# Patient Record
Sex: MALE
[De-identification: names, ages, dates, MRNs, and addresses within clinical notes are randomized per-mention and may not be internally consistent; named-entity substitution may affect disease eponyms.]

---

## 2018-04-23 ENCOUNTER — APPOINTMENT (OUTPATIENT)
Dept: NEUROSURGERY | Facility: CLINIC | Age: 48
End: 2018-04-23

## 2018-04-23 PROBLEM — Z00.00 ENCOUNTER FOR PREVENTIVE HEALTH EXAMINATION: Status: ACTIVE | Noted: 2018-04-23

## 2018-05-10 ENCOUNTER — TRANSCRIPTION ENCOUNTER (OUTPATIENT)
Age: 48
End: 2018-05-10

## 2018-07-17 ENCOUNTER — APPOINTMENT (OUTPATIENT)
Dept: NEUROSURGERY | Facility: CLINIC | Age: 48
End: 2018-07-17
Payer: MEDICAID

## 2018-07-17 PROCEDURE — 99441: CPT

## 2018-08-27 ENCOUNTER — TRANSCRIPTION ENCOUNTER (OUTPATIENT)
Age: 48
End: 2018-08-27

## 2018-09-07 ENCOUNTER — TRANSCRIPTION ENCOUNTER (OUTPATIENT)
Age: 48
End: 2018-09-07

## 2019-01-28 ENCOUNTER — TRANSCRIPTION ENCOUNTER (OUTPATIENT)
Age: 49
End: 2019-01-28

## 2019-06-25 ENCOUNTER — APPOINTMENT (OUTPATIENT)
Dept: NEUROSURGERY | Facility: CLINIC | Age: 49
End: 2019-06-25
Payer: MEDICAID

## 2019-06-25 VITALS — WEIGHT: 162 LBS | BODY MASS INDEX: 23.19 KG/M2 | HEIGHT: 70 IN

## 2019-06-25 DIAGNOSIS — F17.200 NICOTINE DEPENDENCE, UNSPECIFIED, UNCOMPLICATED: ICD-10-CM

## 2019-06-25 DIAGNOSIS — Z87.09 PERSONAL HISTORY OF OTHER DISEASES OF THE RESPIRATORY SYSTEM: ICD-10-CM

## 2019-06-25 PROCEDURE — 99205 OFFICE O/P NEW HI 60 MIN: CPT

## 2019-06-25 RX ORDER — BIOTIN 10 MG
TABLET ORAL
Refills: 0 | Status: ACTIVE | COMMUNITY

## 2019-06-25 RX ORDER — ALBUTEROL 90 MCG
AEROSOL (GRAM) INHALATION
Refills: 0 | Status: ACTIVE | COMMUNITY

## 2019-06-25 NOTE — PHYSICAL EXAM
[FreeTextEntry1] : Physical exam: \par \par Vital Signs - stable\par \par General - well appearing in no acute distress\par \par HEENT-normocephalic atraumatic\par \par Skin-intact\par \par CV-no edema, good distal pulses, radial and DP\par \par Lungs-no wheezing or cyanosis\par \par Musculoskeletal- significant decreased ROM; no tenderness to palpation; negative Spurling’s; negative Lhermittes; negative straight leg raise; negative FABERs; negative Tinels\par \par Neuro- awake, alert, and oriented; CN II-XII intact; 5/5 strength; sensation intact; reflexes normal; no Hoffmans or clonus, gait intact\par \par \par

## 2019-06-25 NOTE — HISTORY OF PRESENT ILLNESS
[FreeTextEntry1] : This is a 49 yrs old male with a history of two cervical spine surgery presents today for a consultation of low back pain radiating to right posterior leg down the calf for 10 years, and feels it is worsening. Denies numbness, tingling, and weakness. 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 He has not been evaluated by pain management due to his fear of needles. He is seeing a chiropractor who manipulates his back, and he does get relief with his symptoms. Pain is worse with lifting and sitting. Patient does not have any recent imaging.

## 2019-09-10 ENCOUNTER — APPOINTMENT (OUTPATIENT)
Dept: NEUROSURGERY | Facility: CLINIC | Age: 49
End: 2019-09-10
Payer: MEDICAID

## 2019-09-10 VITALS — BODY MASS INDEX: 22.9 KG/M2 | HEIGHT: 70 IN | WEIGHT: 160 LBS

## 2019-09-10 PROCEDURE — 99213 OFFICE O/P EST LOW 20 MIN: CPT

## 2019-09-10 RX ORDER — CHLORZOXAZONE 500 MG/1
500 TABLET ORAL EVERY 8 HOURS
Qty: 15 | Refills: 0 | Status: DISCONTINUED | COMMUNITY
Start: 2019-06-25 | End: 2019-09-10

## 2019-09-10 NOTE — HISTORY OF PRESENT ILLNESS
[FreeTextEntry1] : Patient returns in follow up to discuss his MRI results, reports of low back pain radiating to both hips. Today he denies right leg pain, though on occasion he pain does radiate to the leg. He reports he did not attempt to pursue to see pain management. 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 \par \par MRI of the lumbar spine without contrast done on 8/13/19 showed mild to moderate multilevel degenerative changes of lumbar spine, worse at L4-5. Neural foraminal stenosis is moderate on the left at L3-4 and is moderate on the right at L5-S1.\par \par Xray of the lumbar spine done on 6/28/19 showed degenerative changes most pronounced at L4-5 and L5-S1

## 2019-09-10 NOTE — ASSESSMENT
[FreeTextEntry1] : At this time, I do not believe Mr. Matos is a good surgical candidate.  I recommend pain management for injections.

## 2019-10-30 ENCOUNTER — RX RENEWAL (OUTPATIENT)
Age: 49
End: 2019-10-30

## 2020-09-15 ENCOUNTER — RX RENEWAL (OUTPATIENT)
Age: 50
End: 2020-09-15

## 2021-01-04 ENCOUNTER — RX RENEWAL (OUTPATIENT)
Age: 51
End: 2021-01-04

## 2021-01-14 ENCOUNTER — APPOINTMENT (OUTPATIENT)
Dept: NEUROSURGERY | Facility: CLINIC | Age: 51
End: 2021-01-14
Payer: MEDICAID

## 2021-01-14 PROCEDURE — 99213 OFFICE O/P EST LOW 20 MIN: CPT

## 2021-01-14 PROCEDURE — 99072 ADDL SUPL MATRL&STAF TM PHE: CPT

## 2021-01-14 RX ORDER — CYCLOBENZAPRINE HYDROCHLORIDE 5 MG/1
5 TABLET, FILM COATED ORAL 3 TIMES DAILY
Qty: 90 | Refills: 3 | Status: ACTIVE | COMMUNITY
Start: 2021-01-14 | End: 1900-01-01

## 2021-01-14 RX ORDER — DICLOFENAC POTASSIUM 50 MG/1
50 TABLET, COATED ORAL 3 TIMES DAILY
Qty: 90 | Refills: 0 | Status: ACTIVE | COMMUNITY
Start: 2021-01-14 | End: 1900-01-01

## 2021-02-11 ENCOUNTER — APPOINTMENT (OUTPATIENT)
Dept: NEUROSURGERY | Facility: CLINIC | Age: 51
End: 2021-02-11
Payer: MEDICAID

## 2021-02-11 VITALS — HEIGHT: 70 IN | WEIGHT: 160 LBS | RESPIRATION RATE: 16 BRPM | BODY MASS INDEX: 22.9 KG/M2

## 2021-02-11 DIAGNOSIS — M54.16 RADICULOPATHY, LUMBAR REGION: ICD-10-CM

## 2021-02-11 PROCEDURE — 99214 OFFICE O/P EST MOD 30 MIN: CPT

## 2021-02-11 PROCEDURE — 99072 ADDL SUPL MATRL&STAF TM PHE: CPT

## 2021-02-22 ENCOUNTER — APPOINTMENT (OUTPATIENT)
Dept: RHEUMATOLOGY | Facility: CLINIC | Age: 51
End: 2021-02-22
Payer: MEDICAID

## 2021-02-22 VITALS
HEART RATE: 96 BPM | TEMPERATURE: 97.8 F | WEIGHT: 164 LBS | HEIGHT: 70 IN | BODY MASS INDEX: 23.48 KG/M2 | SYSTOLIC BLOOD PRESSURE: 124 MMHG | OXYGEN SATURATION: 98 % | DIASTOLIC BLOOD PRESSURE: 86 MMHG

## 2021-02-22 PROCEDURE — 99203 OFFICE O/P NEW LOW 30 MIN: CPT

## 2021-02-22 PROCEDURE — 99072 ADDL SUPL MATRL&STAF TM PHE: CPT

## 2021-02-22 RX ORDER — BUDESONIDE AND FORMOTEROL FUMARATE DIHYDRATE 160; 4.5 UG/1; UG/1
AEROSOL RESPIRATORY (INHALATION)
Refills: 0 | Status: DISCONTINUED | COMMUNITY
End: 2021-02-22

## 2021-02-22 RX ORDER — OXYCODONE AND ACETAMINOPHEN 5; 325 MG/1; MG/1
5-325 TABLET ORAL DAILY
Qty: 7 | Refills: 0 | Status: DISCONTINUED | COMMUNITY
Start: 2019-09-10 | End: 2021-02-22

## 2021-02-24 RX ORDER — TRAMADOL HYDROCHLORIDE 50 MG/1
50 TABLET, COATED ORAL EVERY 8 HOURS
Qty: 21 | Refills: 0 | Status: ACTIVE | COMMUNITY
Start: 2021-02-24 | End: 1900-01-01

## 2021-02-24 NOTE — DATA REVIEWED
[FreeTextEntry1] : MRI cervical spine reviewed report 1/27/2021\par MRI lumbar spine: reviewed report 1/27/2021

## 2021-02-24 NOTE — PHYSICAL EXAM
[General Appearance - Alert] : alert [General Appearance - Well-Appearing] : healthy appearing [Sclera] : the sclera and conjunctiva were normal [] : no respiratory distress [Respiration, Rhythm And Depth] : normal respiratory rhythm and effort [Exaggerated Use Of Accessory Muscles For Inspiration] : no accessory muscle use [Edema] : there was no peripheral edema [Abnormal Walk] : normal gait [Musculoskeletal - Swelling] : no joint swelling seen [Oriented To Time, Place, And Person] : oriented to person, place, and time [Impaired Insight] : insight and judgment were intact [Affect] : the affect was normal [FreeTextEntry1] : No active synovitis of the upper and lower extremities bilaterally.

## 2021-02-24 NOTE — ASSESSMENT
[FreeTextEntry1] : 50 year old man referred for rheumatology evaluation.  Patient with longstanding history of lumbar and cervical degenerative changes associated with pain, following closely with neurosurgery, s/p cervical anterior fusion C5-T1, MRI of the lumbar spine with degenerative disc disease with encroachment of the exiting left L3 nerve with endplate degeneration as well as cervical disc disease and stenosis.  Previous lumbar spine xray with sacrum appears within normal limits.  At this time, pain more consistent with degenerative arthritis/ mechanical back pain, would agree with pain management evaluation.  Discussed physical therapy as well, EMG pending.  Follow up as needed.

## 2021-02-24 NOTE — HISTORY OF PRESENT ILLNESS
[FreeTextEntry1] : 50 year old man self referred for chronic back pain\par Patient works as musician for many years\par Patient follows closely with neurosurgery, Dr. Field, has had multilple cervical spine surgeries, has not had previous surgery for the lumbar spine.  Patient is not interested in further surgery at this time.\par \par Sister with scoliosis. no other family history of spine arthritis or back problems\par Father and GF also musicians have arthritis in hand\par Patient reports some pain in the wrists sometimes\par \par Patient has not received injections in the spine for pain previously, he does not like needles so tries to avoid \par Had multiple surgeries in past for cervical spine\par \par Tries to get out and walk everyday\par Has a referral for PT as well\par Pain present everyday, worse with activity\par No morning stiffness\par Pain does not improve with activity\par \par Tries not to take anything for pain\par Takes diclofenac intermittently and rarely percocet for the back pain\par \par No history of IBD \par No history of psoriasis\par No history of uveitis\par No weight changes\par +tobacco\par NKDA\par \par No medications on regular basis, +inhaler\par Sees vascular doctor, [facility], possible vein procedure pending\par No pedal edema\par Some calf pain, feels related to varicose veins, does not wake up from sleep \par

## 2021-11-11 ENCOUNTER — OUTPATIENT (OUTPATIENT)
Dept: OUTPATIENT SERVICES | Facility: HOSPITAL | Age: 51
LOS: 1 days | End: 2021-11-11

## 2021-11-11 ENCOUNTER — APPOINTMENT (OUTPATIENT)
Dept: NEUROSURGERY | Facility: CLINIC | Age: 51
End: 2021-11-11
Payer: MEDICAID

## 2021-11-11 ENCOUNTER — APPOINTMENT (OUTPATIENT)
Dept: RADIOLOGY | Facility: CLINIC | Age: 51
End: 2021-11-11
Payer: MEDICAID

## 2021-11-11 VITALS — HEIGHT: 70 IN | WEIGHT: 160 LBS | BODY MASS INDEX: 22.9 KG/M2

## 2021-11-11 DIAGNOSIS — M48.02 SPINAL STENOSIS, CERVICAL REGION: ICD-10-CM

## 2021-11-11 DIAGNOSIS — M50.20 OTHER CERVICAL DISC DISPLACEMENT, UNSPECIFIED CERVICAL REGION: ICD-10-CM

## 2021-11-11 PROCEDURE — 99213 OFFICE O/P EST LOW 20 MIN: CPT

## 2021-11-11 PROCEDURE — 72052 X-RAY EXAM NECK SPINE 6/>VWS: CPT | Mod: 26

## 2021-11-11 RX ORDER — CYCLOBENZAPRINE HYDROCHLORIDE 10 MG/1
10 TABLET, FILM COATED ORAL TWICE DAILY
Qty: 60 | Refills: 0 | Status: ACTIVE | COMMUNITY
Start: 2019-06-27 | End: 1900-01-01

## 2021-11-11 RX ORDER — DICLOFENAC SODIUM 75 MG/1
75 TABLET, DELAYED RELEASE ORAL
Qty: 60 | Refills: 3 | Status: ACTIVE | COMMUNITY
Start: 2019-09-10 | End: 1900-01-01

## 2021-12-02 RX ORDER — OXYCODONE AND ACETAMINOPHEN 5; 325 MG/1; MG/1
5-325 TABLET ORAL
Qty: 14 | Refills: 0 | Status: ACTIVE | COMMUNITY
Start: 2019-06-25 | End: 1900-01-01

## 2021-12-06 ENCOUNTER — APPOINTMENT (OUTPATIENT)
Dept: NEUROSURGERY | Facility: CLINIC | Age: 51
End: 2021-12-06
Payer: MEDICAID

## 2021-12-06 DIAGNOSIS — M47.22 OTHER SPONDYLOSIS WITH RADICULOPATHY, CERVICAL REGION: ICD-10-CM

## 2021-12-06 PROCEDURE — 99441: CPT

## 2021-12-08 PROBLEM — M47.22 CERVICAL SPONDYLOSIS WITH RADICULOPATHY: Status: ACTIVE | Noted: 2021-01-14

## 2021-12-08 NOTE — HISTORY OF PRESENT ILLNESS
[FreeTextEntry1] : Mr. Matos has neck pain radiating to both arms.  He had a C5-7 then C7-T1 ACDF years ago with good result.  Recent imaging shows adjacent level disease with herniation at C4-5.  We discussed C4-5 ACDF vs continued PT and injections.  He asked for narcotic medications, which I did not prescribe.  He was referred to pain management multiple times.  I discussed with him that surgery is an option but repeated narcotic use is not a long term solution.  He will follow-up with me as needed.\par \par This conversation took 5-10 minutes.

## 2022-01-19 ENCOUNTER — NON-APPOINTMENT (OUTPATIENT)
Age: 52
End: 2022-01-19

## 2022-03-08 RX ORDER — GABAPENTIN 300 MG/1
300 CAPSULE ORAL EVERY 8 HOURS
Qty: 90 | Refills: 2 | Status: ACTIVE | COMMUNITY
Start: 2022-01-27 | End: 1900-01-01

## 2022-09-21 ENCOUNTER — APPOINTMENT (OUTPATIENT)
Dept: NEUROSURGERY | Facility: CLINIC | Age: 52
End: 2022-09-21

## 2022-09-22 ENCOUNTER — APPOINTMENT (OUTPATIENT)
Dept: NEUROSURGERY | Facility: CLINIC | Age: 52
End: 2022-09-22

## 2022-12-12 ENCOUNTER — RX RENEWAL (OUTPATIENT)
Age: 52
End: 2022-12-12

## 2023-01-10 ENCOUNTER — RX RENEWAL (OUTPATIENT)
Age: 53
End: 2023-01-10

## 2023-01-17 ENCOUNTER — RX RENEWAL (OUTPATIENT)
Age: 53
End: 2023-01-17